# Patient Record
Sex: FEMALE | Race: WHITE | Employment: UNEMPLOYED | URBAN - METROPOLITAN AREA
[De-identification: names, ages, dates, MRNs, and addresses within clinical notes are randomized per-mention and may not be internally consistent; named-entity substitution may affect disease eponyms.]

---

## 2023-05-23 ENCOUNTER — HOSPITAL ENCOUNTER (EMERGENCY)
Age: 28
Discharge: HOME | End: 2023-05-23
Attending: EMERGENCY MEDICINE
Payer: COMMERCIAL

## 2023-05-23 VITALS
SYSTOLIC BLOOD PRESSURE: 136 MMHG | RESPIRATION RATE: 22 BRPM | WEIGHT: 130 LBS | HEIGHT: 65 IN | TEMPERATURE: 97.7 F | BODY MASS INDEX: 21.66 KG/M2 | DIASTOLIC BLOOD PRESSURE: 93 MMHG | OXYGEN SATURATION: 100 % | HEART RATE: 83 BPM

## 2023-05-23 DIAGNOSIS — F41.1 ANXIETY STATE: Primary | ICD-10-CM

## 2023-05-23 PROCEDURE — 99283 EMERGENCY DEPT VISIT LOW MDM: CPT

## 2023-05-23 PROCEDURE — 6370000000 HC RX 637 (ALT 250 FOR IP): Performed by: PHYSICIAN ASSISTANT

## 2023-05-23 RX ORDER — ONDANSETRON 4 MG/1
4 TABLET, FILM COATED ORAL 3 TIMES DAILY PRN
Qty: 15 TABLET | Refills: 2 | Status: SHIPPED | OUTPATIENT
Start: 2023-05-23

## 2023-05-23 RX ORDER — ONDANSETRON 2 MG/ML
4 INJECTION INTRAMUSCULAR; INTRAVENOUS
Status: DISCONTINUED | OUTPATIENT
Start: 2023-05-23 | End: 2023-05-23

## 2023-05-23 RX ORDER — ONDANSETRON 4 MG/1
4 TABLET, ORALLY DISINTEGRATING ORAL
Status: COMPLETED | OUTPATIENT
Start: 2023-05-23 | End: 2023-05-23

## 2023-05-23 RX ORDER — LORAZEPAM 1 MG/1
0.5 TABLET ORAL EVERY 4 HOURS PRN
Status: DISCONTINUED | OUTPATIENT
Start: 2023-05-23 | End: 2023-05-23

## 2023-05-23 RX ORDER — HYDROXYZINE PAMOATE 25 MG/1
25 CAPSULE ORAL 3 TIMES DAILY PRN
Qty: 28 CAPSULE | Refills: 0 | Status: SHIPPED | OUTPATIENT
Start: 2023-05-23 | End: 2023-06-20

## 2023-05-23 RX ORDER — LORAZEPAM 1 MG/1
1 TABLET ORAL EVERY 4 HOURS PRN
Status: DISCONTINUED | OUTPATIENT
Start: 2023-05-23 | End: 2023-05-23 | Stop reason: HOSPADM

## 2023-05-23 RX ADMIN — LORAZEPAM 1 MG: 1 TABLET ORAL at 01:34

## 2023-05-23 RX ADMIN — ONDANSETRON 4 MG: 4 TABLET, ORALLY DISINTEGRATING ORAL at 01:34

## 2023-05-23 NOTE — ED NOTES
I have reviewed discharge instructions with the patient. The patient verbalized understanding. Patient left ED via Discharge Method: ambulatory to Home with family. Opportunity for questions and clarification provided. Patient given 2 scripts. To continue your aftercare when you leave the hospital, you may receive an automated call from our care team to check in on how you are doing. This is a free service and part of our promise to provide the best care and service to meet your aftercare needs.  If you have questions, or wish to unsubscribe from this service please call [phone].   Thank you for Choosing our [facility] Emergency Department. ;       Kristin Jefferson RN  05/23/23 [id number]

## 2023-05-23 NOTE — ED PROVIDER NOTES
Emergency Department Provider Note       PCP: None None   Age: 24 y.o. Sex: female     DISPOSITION Decision To Discharge 05/23/2023 02:27:13 AM       ICD-10-CM    1. Anxiety state  F41.1           Medical Decision Making     Complexity of Problems Addressed:  Complexity of Problem: 1 acute complicated illness or injury. Data Reviewed and Analyzed:  Category 1:   I independently ordered and reviewed each unique test.  I reviewed external records: ED visit note from an outside group. I reviewed external records: provider visit note from outside specialist.     Category 3: Discussion of management or test interpretation. 22-year-old female here with anxiety exacerbation. Anxiety is typically well controlled at home, in [place], with medical marijuana. Given different state legality of medical marijuana, she was unable to bring any with her. Feeling as if she was able to get through without it, and these symptoms developed over the last several days and now she still with moderate to severe anxiety attack. Well controlled here in department with single dose of 1 mg p.o. Ativan. I will get her discharged home with Zofran and Vistaril. Patient appreciative of care plan today. Risk of Complications and/or Morbidity of Patient Management:  OTC drug management performed, Prescription drug management performed, and Shared medical decision making was utilized in creating the patients health plan today. History     Sanford Brothers is a 24 y.o. female who presents to the Emergency Department with chief complaint of    Chief Complaint   Patient presents with    Anxiety      Patient is a pleasant 22-year-old female who presents here with chief complaint of anxiety attack. She has a well established history of anxiety. She is visiting from [place] for a death. She will be here for an unknown amount of time. She is already been here a week.   Her anxiety is well controlled in [place] with her medical

## 2023-05-23 NOTE — ED TRIAGE NOTES
Pt arrives to ED c/o anxiety. Pt and family is here visiting family. Per pt she has a medical marijuana card from [place] but did not bring it here. Pt c/o nausea, vomiting, and dry mouth for the past three nights.